# Patient Record
Sex: MALE | Race: WHITE
[De-identification: names, ages, dates, MRNs, and addresses within clinical notes are randomized per-mention and may not be internally consistent; named-entity substitution may affect disease eponyms.]

---

## 2018-02-07 ENCOUNTER — HOSPITAL ENCOUNTER (EMERGENCY)
Dept: HOSPITAL 54 - ER | Age: 78
Discharge: HOME | End: 2018-02-07
Payer: MEDICARE

## 2018-02-07 VITALS — HEIGHT: 72 IN | BODY MASS INDEX: 25.73 KG/M2 | WEIGHT: 190 LBS

## 2018-02-07 VITALS — DIASTOLIC BLOOD PRESSURE: 58 MMHG | SYSTOLIC BLOOD PRESSURE: 132 MMHG
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
## 2018-02-07 DIAGNOSIS — I10: ICD-10-CM
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
## 2018-02-07 DIAGNOSIS — S01.112A: Primary | ICD-10-CM
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
## 2018-02-07 PROCEDURE — 99284 EMERGENCY DEPT VISIT MOD MDM: CPT

## 2018-02-07 PROCEDURE — 70450 CT HEAD/BRAIN W/O DYE: CPT

## 2018-02-07 PROCEDURE — 90715 TDAP VACCINE 7 YRS/> IM: CPT

## 2018-02-07 PROCEDURE — A4606 OXYGEN PROBE USED W OXIMETER: HCPCS

## 2018-02-07 PROCEDURE — Z7610: HCPCS

## 2018-02-07 PROCEDURE — 90471 IMMUNIZATION ADMIN: CPT

## 2018-02-07 PROCEDURE — 12011 RPR F/E/E/N/L/M 2.5 CM/<: CPT

## 2018-02-07 NOTE — NUR
PT SEEN BY DR. ONTIVEROS. LACERATION STERI STRIPPED/ TDAP GIVEN TO PT ACI GIVEN TO 
PT. PT TO FOLLOW UP WITH PMD IF NEEDED.